# Patient Record
Sex: FEMALE | Race: WHITE
[De-identification: names, ages, dates, MRNs, and addresses within clinical notes are randomized per-mention and may not be internally consistent; named-entity substitution may affect disease eponyms.]

---

## 2019-10-22 LAB
BASOPHILS # BLD AUTO: 0.1 10*3/UL (ref 0–0.1)
BASOPHILS NFR BLD AUTO: 0.5 % (ref 0–1)
DEPRECATED NEUTROPHILS # BLD AUTO: 7.5 10*3/UL (ref 2.1–6.9)
EOSINOPHIL # BLD AUTO: 0.6 10*3/UL (ref 0–0.4)
EOSINOPHIL NFR BLD AUTO: 5.7 % (ref 0–6)
ERYTHROCYTE [DISTWIDTH] IN CORD BLOOD: 12.2 % (ref 11.7–14.4)
HCT VFR BLD AUTO: 38.3 % (ref 34.2–44.1)
HGB BLD-MCNC: 12.9 G/DL (ref 12–16)
LYMPHOCYTES # BLD: 2.3 10*3/UL (ref 1–3.2)
LYMPHOCYTES NFR BLD AUTO: 20.1 % (ref 18–39.1)
MCH RBC QN AUTO: 27.4 PG (ref 28–32)
MCHC RBC AUTO-ENTMCNC: 33.7 G/DL (ref 31–35)
MCV RBC AUTO: 81.3 FL (ref 81–99)
MONOCYTES # BLD AUTO: 0.7 10*3/UL (ref 0.2–0.8)
MONOCYTES NFR BLD AUTO: 6.4 % (ref 4.4–11.3)
NEUTS SEG NFR BLD AUTO: 66.9 % (ref 38.7–80)
PLATELET # BLD AUTO: 324 X10E3/UL (ref 140–360)
RBC # BLD AUTO: 4.71 X10E6/UL (ref 3.6–5.1)

## 2019-10-22 NOTE — DIAGNOSTIC IMAGING REPORT
EXAMINATION:  CHEST 2 VIEWS    



INDICATION: Pre-operative



COMPARISON: None

     

FINDINGS:



LINES/TUBES:None



LUNGS:The lungs are well-inflated. No focal consolidation or pulmonary edema.



PLEURA:No pleural effusion or pneumothorax.



MEDIASTINUM:The cardiomediastinal silhouette appears normal in size and shape. 



BONES/SOFT TISSUES:No acute osseous injury.



ABDOMEN:No free air under the diaphragm. Status post cholecystectomy.





IMPRESSION: 

No focal pneumonia or pulmonary edema.



Signed by: Brittany Kohli MD on 10/22/2019 3:51 PM

## 2019-11-01 ENCOUNTER — HOSPITAL ENCOUNTER (OUTPATIENT)
Dept: HOSPITAL 88 - OR | Age: 44
Discharge: HOME | End: 2019-11-01
Attending: PODIATRIST
Payer: COMMERCIAL

## 2019-11-01 VITALS — SYSTOLIC BLOOD PRESSURE: 136 MMHG | DIASTOLIC BLOOD PRESSURE: 74 MMHG

## 2019-11-01 DIAGNOSIS — M24.574: ICD-10-CM

## 2019-11-01 DIAGNOSIS — Z01.812: ICD-10-CM

## 2019-11-01 DIAGNOSIS — M21.271: ICD-10-CM

## 2019-11-01 DIAGNOSIS — Z01.818: ICD-10-CM

## 2019-11-01 DIAGNOSIS — F32.9: ICD-10-CM

## 2019-11-01 DIAGNOSIS — Z01.810: ICD-10-CM

## 2019-11-01 DIAGNOSIS — Z88.0: ICD-10-CM

## 2019-11-01 DIAGNOSIS — M21.541: ICD-10-CM

## 2019-11-01 DIAGNOSIS — J45.909: ICD-10-CM

## 2019-11-01 DIAGNOSIS — F41.9: ICD-10-CM

## 2019-11-01 DIAGNOSIS — Z88.1: ICD-10-CM

## 2019-11-01 DIAGNOSIS — M77.9: ICD-10-CM

## 2019-11-01 DIAGNOSIS — M19.071: Primary | ICD-10-CM

## 2019-11-01 PROCEDURE — 28737 REVISION OF FOOT BONES: CPT

## 2019-11-01 PROCEDURE — 85025 COMPLETE CBC W/AUTO DIFF WBC: CPT

## 2019-11-01 PROCEDURE — 71046 X-RAY EXAM CHEST 2 VIEWS: CPT

## 2019-11-01 PROCEDURE — 27687 REVISION OF CALF TENDON: CPT

## 2019-11-01 PROCEDURE — 93005 ELECTROCARDIOGRAM TRACING: CPT

## 2019-11-01 PROCEDURE — 81025 URINE PREGNANCY TEST: CPT

## 2019-11-01 PROCEDURE — 36415 COLL VENOUS BLD VENIPUNCTURE: CPT

## 2019-11-01 PROCEDURE — 28715 ARTHRODESIS TRIPLE: CPT

## 2019-11-02 NOTE — OPERATIVE REPORT
DATE OF PROCEDURE:  11/01/2019

 

SURGEON:  Winston Lieberman DPM

 

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

complex, varus heel and plantar flexion most severely at the talonavicular articulation. 

 

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

complex, varus heel and plantar flexion most severely at the talonavicular articulation. 

 

TITLE OF THE OPERATION:  

1. Gastrocnemius recession.

2. Triple arthrodesis to include subtalar joint, talonavicular joint, and the

calcaneocuboid joint. 

3. Fusion of the navicular-medial cuneiform joint, all on the right foot.

 

ASSISTANT:  Rea Hernandez DPM.

 

PROCEDURE IN DETAIL:  The patient was taken to the operating room in a mildly sedated

stated and placed upon the operating table in supine position.  Following induction of

general anesthetic, the right lower extremity was elevated to 60 degrees to exsanguinate

along with Esmarch bandaging prior to performing following procedure.  The foot was

placed on the operating table in a supine position with elevation to expose the

posterior compartment.  A transverse gastrocnemius soleus recession was performed.  All

deep tissues were transversely sectioned to allow for a dorsiflexion at the ankle up to

neutral.  It had been an approximately 20 degrees of plantar flexion. With the heel in

neutral, the area was irrigated and closed with 3-0 Vicryl and 4-0 nylon.  Attention was

then directed to the lateral aspect of the foot, where dissection was performed of the

lateral calcaneocuboid joint and subtalar joint through a lateral incision.  The

incision was deepened via sharp and blunt dissection down to the level of the capsular

structure.  Care was taken to identify and retract all vital structures encountered.

Sural nerve was reflected from harm's way.  The underlying tissues were evaluated and

calcaneocuboid joint capsule was incised and the joint was debrided off all underlying

cartilaginous structures.  Curette, key elevator, and osteotome were used to accomplish

the removal of all cartilaginous surface.  A 2.3 drill bit was used to fenestrate and

fish-scale the bone.  Attention was then directed to the subtalar joint and both the

anterior and posterior and middle facets were all debrided of cartilaginous structure.

A wedging of the calcaneus was performed with a pie-shaped wedge taken from the

calcaneus wider laterally than medially in order to achieve a rectus to slightly valgus

positioning of the heel.  This all having been accomplished under fluoroscopy, the areas

were irrigated with copious amounts of sterile saline solution.  Attention was then

directed to the medial aspect of the foot, where a 3rd incision was placed overlying the

talonavicular joint.  This was deepened via sharp and blunt dissection down to the level

of the highest point of the deformity.  There was a significant amount of necrotic bone

and arthritic bone in that area, all of which was debrided away.  The talonavicular

articulation was also resected and wedged in an appropriate fashion in order to correct

the plantar flexure nature of the 1st ray and to straighten the severe cavus clubfoot

deformity.  The navicular and medial cuneiform were also noted to be involved in the

deformity and also had a significant amount of arthritic degeneration. Intraoperative

decision to include this joint in the fusion was performed as it was necessary to

achieve the appropriate level of correction and fusion.  All cartilage was removed and

once again the dorsiflexor wedge was performed in order to elevate the 1st ray relative

to the forefoot.  It once again has been irrigated and then all bones and joints were

put through their maximum range of motion with regard to dorsiflexion and pronation.  It

was determined under fluoroscopy that adequate correction had been achieved.  Therefore,

attention was directed to the posterior facet of the subtalar joint.  The heel was

loaded, distracted laterally as far as possible and posteriorly as far as possible.  Two

K-wires were advanced across the subtalar joint and under fluoroscopy, a 7-0 screw with

buried head was used to fuse the joint.  That area was then irrigated with copious

amounts of sterile saline solution and attention was directed to the calcaneocuboid

joint, which was noted to still be in good alignment back to the medial aspect, where

the talonavicular joint was dialed in and fused with two cross screws 4.0 in diameter

and 30 mm, 50 mm, and 55 mm in length.  Those screws with the additional use of BIO4

viable bone matrix was used to facilitate fusion.  The screws were advanced through the

navicular and into the talus creating an excellent bony apposition, all done under

fluoroscopy.  The intraoperative decision to include the medial cuneiform and navicular

joint was performed as well and fixated.  This having been accomplished with BIO4 as

well, the area was irrigated and attention was directed back to the calcaneocuboid

joint, where two bone staples were used to adequately compress and fixate the

calcaneocuboid joint.  These were osteosynthesis compression staples.  BIO4 was used in

that articulation as well.  The remaining BIO4 was packed into the sinus tarsi at the

2-hour sandro.  The tourniquet was released after packing of the wounds and the remainder

of the procedure approximately an additional hour was performed after a period of

adequate tissue perfusion with release of the tourniquet.  This having been accomplished

and deep closure being performed at this point with a combination of 3-0 Vicryl, 4-0

Vicryl, and then 4-0 nylon in the skin, the areas of surgery were all well coapted and

closed including posterior heel, where the plan had been advanced.  This having been

accomplished and the appropriate compression having been accomplished, the areas were

then blocked with a combination of 0.5 Marcaine and no Decadron was used.

Approximately, 20 mL of block were used in strategic positions.  The patient was then

placed in a posterior splint with appropriate padding and release of the pneumatic thigh

tourniquet.  It showed a normal hyperemic flush to 

all digits of the foot and the patient left the operating room with vital signs stable

in apparent satisfactory condition, having tolerated both anesthetic and procedure very

well. 

 

 

 

 

______________________________

VLADIMIR Woods/JEFFERSON

D:  11/02/2019 00:00:10

T:  11/02/2019 02:10:24

Job #:  [id number]

## 2019-11-03 ENCOUNTER — HOSPITAL ENCOUNTER (EMERGENCY)
Dept: HOSPITAL 88 - ER | Age: 44
Discharge: HOME | End: 2019-11-03
Payer: COMMERCIAL

## 2019-11-03 VITALS — BODY MASS INDEX: 36.37 KG/M2 | HEIGHT: 64 IN | WEIGHT: 213 LBS

## 2019-11-03 VITALS — DIASTOLIC BLOOD PRESSURE: 86 MMHG | SYSTOLIC BLOOD PRESSURE: 139 MMHG

## 2019-11-03 DIAGNOSIS — F41.9: ICD-10-CM

## 2019-11-03 DIAGNOSIS — Z98.890: ICD-10-CM

## 2019-11-03 DIAGNOSIS — J45.909: ICD-10-CM

## 2019-11-03 DIAGNOSIS — Z86.718: ICD-10-CM

## 2019-11-03 DIAGNOSIS — M25.571: Primary | ICD-10-CM

## 2019-11-03 LAB
ALBUMIN SERPL-MCNC: 3.1 G/DL (ref 3.5–5)
ALBUMIN/GLOB SERPL: 0.9 {RATIO} (ref 0.8–2)
ALP SERPL-CCNC: 57 IU/L (ref 40–150)
ALT SERPL-CCNC: 35 IU/L (ref 0–55)
ANION GAP SERPL CALC-SCNC: 16.1 MMOL/L (ref 8–16)
BASOPHILS # BLD AUTO: 0 10*3/UL (ref 0–0.1)
BASOPHILS NFR BLD AUTO: 0.2 % (ref 0–1)
BUN SERPL-MCNC: 9 MG/DL (ref 7–26)
BUN/CREAT SERPL: 12 (ref 6–25)
CALCIUM SERPL-MCNC: 8.7 MG/DL (ref 8.4–10.2)
CHLORIDE SERPL-SCNC: 103 MMOL/L (ref 98–107)
CO2 SERPL-SCNC: 21 MMOL/L (ref 22–29)
DEPRECATED NEUTROPHILS # BLD AUTO: 8.3 10*3/UL (ref 2.1–6.9)
EGFRCR SERPLBLD CKD-EPI 2021: > 60 ML/MIN (ref 60–?)
EOSINOPHIL # BLD AUTO: 0.1 10*3/UL (ref 0–0.4)
EOSINOPHIL NFR BLD AUTO: 1 % (ref 0–6)
ERYTHROCYTE [DISTWIDTH] IN CORD BLOOD: 12.8 % (ref 11.7–14.4)
GLOBULIN PLAS-MCNC: 3.4 G/DL (ref 2.3–3.5)
GLUCOSE SERPLBLD-MCNC: 107 MG/DL (ref 74–118)
HCT VFR BLD AUTO: 32.9 % (ref 34.2–44.1)
HGB BLD-MCNC: 10.6 G/DL (ref 12–16)
LYMPHOCYTES # BLD: 3 10*3/UL (ref 1–3.2)
LYMPHOCYTES NFR BLD AUTO: 24 % (ref 18–39.1)
MCH RBC QN AUTO: 27.2 PG (ref 28–32)
MCHC RBC AUTO-ENTMCNC: 32.2 G/DL (ref 31–35)
MCV RBC AUTO: 84.6 FL (ref 81–99)
MONOCYTES # BLD AUTO: 1 10*3/UL (ref 0.2–0.8)
MONOCYTES NFR BLD AUTO: 7.9 % (ref 4.4–11.3)
NEUTS SEG NFR BLD AUTO: 66.3 % (ref 38.7–80)
PLATELET # BLD AUTO: 326 X10E3/UL (ref 140–360)
POTASSIUM SERPL-SCNC: 4.1 MMOL/L (ref 3.5–5.1)
RBC # BLD AUTO: 3.89 X10E6/UL (ref 3.6–5.1)
SODIUM SERPL-SCNC: 136 MMOL/L (ref 136–145)

## 2019-11-03 PROCEDURE — 96375 TX/PRO/DX INJ NEW DRUG ADDON: CPT

## 2019-11-03 PROCEDURE — 99284 EMERGENCY DEPT VISIT MOD MDM: CPT

## 2019-11-03 PROCEDURE — 73590 X-RAY EXAM OF LOWER LEG: CPT

## 2019-11-03 PROCEDURE — 73630 X-RAY EXAM OF FOOT: CPT

## 2019-11-03 PROCEDURE — 85025 COMPLETE CBC W/AUTO DIFF WBC: CPT

## 2019-11-03 PROCEDURE — 93971 EXTREMITY STUDY: CPT

## 2019-11-03 PROCEDURE — 96374 THER/PROPH/DIAG INJ IV PUSH: CPT

## 2019-11-03 PROCEDURE — 80053 COMPREHEN METABOLIC PANEL: CPT

## 2019-11-03 PROCEDURE — 36415 COLL VENOUS BLD VENIPUNCTURE: CPT

## 2019-11-03 NOTE — NUR
DR. PRASAD'S OFFICE PAGED TO INFORM OF NEED TO REMOVE SURGICAL DRESSING TO 
PERFORM DOPPLER TO R/O DVT

## 2019-11-03 NOTE — DIAGNOSTIC IMAGING REPORT
X-ray right foot 3 views

X-ray tib-fib 2 views



HISTORY:  Pain.    

COMPARISON: Ankle radiograph 7 10/10/2019

     

FINDINGS:





Overlying cast/limits bony detail.



Percutaneous soft tissue drain within the lateral ankle.



Bones:



Medial cuneiform-navicular screw fixation. 2 screw fixation of navicular to

talus.

Calcaneotalar screw fixation. 

Evidence of recent calcaneal osteotomy

Multiple tarsal osteotomies.



Joints:

The joint spaces are well-maintained.

Mild improvement in pes cavus.



Soft tissues:

Soft tissue swelling of the foot and ankle.



IMPRESSION: 



Overlying cast/splint limits bony detail.



Recent tarsal osteotomies and multiple new tarsal arthrodesis hardware compared

to 10/10/2019 without evidence of acute hardware complication.

Mild improvement in pes cavus.

Nonspecific soft tissue swelling of the foot and ankle.



Signed by: Ramírez Alegre DO on 11/3/2019 5:47 AM

## 2019-11-03 NOTE — NUR
spoke to dr reyes. covering for dr garcia. informed that pt presented to er 
for uncontrollable pain to r foot. md informed of need to remove 
dressing/splint for doppler study. md states that ok to remove dressing and 
splint. md also gave orders regarding application of dressing and orders p 
doppler study.

## 2020-02-05 LAB
BASOPHILS # BLD AUTO: 0.1 10*3/UL (ref 0–0.1)
BASOPHILS NFR BLD AUTO: 0.6 % (ref 0–1)
DEPRECATED NEUTROPHILS # BLD AUTO: 5 10*3/UL (ref 2.1–6.9)
EOSINOPHIL # BLD AUTO: 0.3 10*3/UL (ref 0–0.4)
EOSINOPHIL NFR BLD AUTO: 3.4 % (ref 0–6)
ERYTHROCYTE [DISTWIDTH] IN CORD BLOOD: 12.3 % (ref 11.7–14.4)
HCT VFR BLD AUTO: 35.3 % (ref 34.2–44.1)
HGB BLD-MCNC: 12 G/DL (ref 12–16)
LYMPHOCYTES # BLD: 2.1 10*3/UL (ref 1–3.2)
LYMPHOCYTES NFR BLD AUTO: 25.8 % (ref 18–39.1)
MCH RBC QN AUTO: 26.8 PG (ref 28–32)
MCHC RBC AUTO-ENTMCNC: 34 G/DL (ref 31–35)
MCV RBC AUTO: 79 FL (ref 81–99)
MONOCYTES # BLD AUTO: 0.6 10*3/UL (ref 0.2–0.8)
MONOCYTES NFR BLD AUTO: 7.4 % (ref 4.4–11.3)
NEUTS SEG NFR BLD AUTO: 62.4 % (ref 38.7–80)
PLATELET # BLD AUTO: 397 X10E3/UL (ref 140–360)
RBC # BLD AUTO: 4.47 X10E6/UL (ref 3.6–5.1)

## 2020-02-07 ENCOUNTER — HOSPITAL ENCOUNTER (OUTPATIENT)
Dept: HOSPITAL 88 - OR | Age: 45
Discharge: HOME | End: 2020-02-07
Attending: PODIATRIST
Payer: COMMERCIAL

## 2020-02-07 VITALS — SYSTOLIC BLOOD PRESSURE: 141 MMHG | DIASTOLIC BLOOD PRESSURE: 83 MMHG

## 2020-02-07 DIAGNOSIS — M20.41: ICD-10-CM

## 2020-02-07 DIAGNOSIS — K58.9: ICD-10-CM

## 2020-02-07 DIAGNOSIS — Z86.718: ICD-10-CM

## 2020-02-07 DIAGNOSIS — Z01.812: ICD-10-CM

## 2020-02-07 DIAGNOSIS — Z88.8: ICD-10-CM

## 2020-02-07 DIAGNOSIS — M79.671: ICD-10-CM

## 2020-02-07 DIAGNOSIS — M19.271: Primary | ICD-10-CM

## 2020-02-07 DIAGNOSIS — Z88.1: ICD-10-CM

## 2020-02-07 DIAGNOSIS — M21.271: ICD-10-CM

## 2020-02-07 DIAGNOSIS — J45.909: ICD-10-CM

## 2020-02-07 PROCEDURE — 28306 INCISION OF METATARSAL: CPT

## 2020-02-07 PROCEDURE — 36415 COLL VENOUS BLD VENIPUNCTURE: CPT

## 2020-02-07 PROCEDURE — 28308 INCISION OF METATARSAL: CPT

## 2020-02-07 PROCEDURE — 28285 REPAIR OF HAMMERTOE: CPT

## 2020-02-07 PROCEDURE — 85025 COMPLETE CBC W/AUTO DIFF WBC: CPT

## 2020-02-07 PROCEDURE — 81025 URINE PREGNANCY TEST: CPT

## 2020-02-07 NOTE — OPERATIVE REPORT
DATE OF PROCEDURE:  02/07/2020

 

SURGEON:  Winston Lieberman DPM

 

ROOM NUMBER:  [facility].

 

PREOPERATIVE DIAGNOSES:  Plantar flexed 1st metatarsal, 2nd metatarsal, 3rd metatarsal,

4th metatarsal, and 5th metatarsal, all of the right foot and rigidly contracted

hammertoes, hallux, 2nd, 3rd, 4th, and 5th digits, all on the right foot. 

 

POSTOPERATIVE DIAGNOSES:  Plantar flexed 1st metatarsal, 2nd metatarsal, 3rd metatarsal,

4th metatarsal, and 5th metatarsal, all of the right foot and rigidly contracted

hammertoes, hallux, 2nd, 3rd, 4th, and 5th digits, all on the right foot. 

 

PROCEDURES PERFORMED:  

1. Elevating osteotomy of the 1st metatarsal of the right foot.

2. Weil osteotomy, 2nd, 3rd, 4th, and 5th metatarsals, right foot.

3. Arthrodesis, hallux, 2nd, 3rd, 4th, and 5th digits, all on the right foot.

 

ANESTHESIA:  General endotracheal.

 

HEMOSTASIS:  Right thigh tourniquet at 350 mmHg.

 

PROCEDURE IN DETAIL:  The patient was taken to the operating room in a mildly sedated

state and placed on the operating table in supine position.  Following induction of

general anesthetic, the right lower extremity was elevated to 60 degrees to exsanguinate

before inflating the pneumatic thigh tourniquet to 350 mmHg to create hemostasis.  Right

lower extremity was placed on the operating table prior to performing the following

procedure. 

 

Procedure #1:  Elevating osteotomy of the 1st metatarsal of the right foot.  An

approximate 6 cm dorsal linear incision was made overlying the 1st metatarsal head of

the right foot.  Incision was deepened via sharp and blunt dissection down to the level

of the dorsal capsular structure.  Care was taken to identify and retract all vital

structures encountered.  Head of the 1st metatarsal delivered in the surgical site and

all periosteum was stripped away from the dorsum of the 1st metatarsal.  This allowed

for penetration to bone.  The bone itself having been similarly penetrated, was noted to

be rigidly contracted with an inability of her hallux to plantar flex accordingly.  A

through and through V osteotomy was placed dorsal to plantar, which allowed for an

elevation of the metatarsal head on the more proximal segment, which was then impacted

and stabilized with two 0.062 K-wires.  The area was irrigated and closed with 3-0

Vicryl and 4-0 nylon.  Attention was then directed to the 2nd, 3rd, 4th, and 5th

metatarsals on the right foot.  Weil osteotomies for shortening and angular correction

were performed; 2, 3, 4, and 5 metatarsals of right foot all through separate incisions

and with separate cannulated screws.  This having been accomplished, the area was

irrigated with copious amounts of sterile saline solution, deep closure, capsule tendon

balancing procedures 3-0 Vicryl and 4-0 nylon.  Attention was then directed to the

digits, where arthrodesis was performed creating a minimal shortening, but a significant

plantar flexions of the digits at the proximal phalangeal joint level through each of

the joint levels.  A 0.045 K-wire was used to apply the appropriate positioning, which

was then pinned in a retrograde technique starting from proximal fashion all the way out

distal and then entering the metatarsal proximally.  Each of the digits was noted to

maintain normal alignment for the first time in many years.  The appropriate mildly

compressive dressings were applied.  After applying the operative dressings, the areas

were blocked with 0.5 Marcaine, Decadron [place]. 

A human tissue allograft was injected to facilitate appropriate healing.  The patient

was placed in a posterior splint.  All activities will be limited.  Return to see me

within 1 week postoperatively. 

 

 

 

 

______________________________

VLADIMIR Woods/JEFFERSON

D:  02/07/2020 15:13:49

T:  02/07/2020 22:43:59

Job #:  [id number]

## 2021-01-06 ENCOUNTER — HOSPITAL ENCOUNTER (EMERGENCY)
Dept: HOSPITAL 88 - ER | Age: 46
Discharge: HOME | End: 2021-01-06
Payer: COMMERCIAL

## 2021-01-06 VITALS — WEIGHT: 213 LBS | HEIGHT: 64 IN | BODY MASS INDEX: 36.37 KG/M2

## 2021-01-06 DIAGNOSIS — Z86.711: ICD-10-CM

## 2021-01-06 DIAGNOSIS — F41.9: ICD-10-CM

## 2021-01-06 DIAGNOSIS — Z88.1: ICD-10-CM

## 2021-01-06 DIAGNOSIS — J45.909: ICD-10-CM

## 2021-01-06 DIAGNOSIS — Z86.718: ICD-10-CM

## 2021-01-06 DIAGNOSIS — Z88.0: ICD-10-CM

## 2021-01-06 DIAGNOSIS — U07.1: Primary | ICD-10-CM

## 2021-01-06 DIAGNOSIS — F32.9: ICD-10-CM

## 2021-01-06 DIAGNOSIS — Z88.8: ICD-10-CM

## 2021-01-06 PROCEDURE — 99283 EMERGENCY DEPT VISIT LOW MDM: CPT
